# Patient Record
Sex: FEMALE | Race: WHITE | Employment: OTHER | URBAN - METROPOLITAN AREA
[De-identification: names, ages, dates, MRNs, and addresses within clinical notes are randomized per-mention and may not be internally consistent; named-entity substitution may affect disease eponyms.]

---

## 2023-12-04 DIAGNOSIS — L70.0 ACNE VULGARIS: Primary | ICD-10-CM

## 2023-12-05 RX ORDER — TAZAROTENE 1 MG/G
CREAM TOPICAL
Qty: 30 G | Refills: 0 | Status: SHIPPED | OUTPATIENT
Start: 2023-12-05

## 2024-01-09 ENCOUNTER — OFFICE VISIT (OUTPATIENT)
Dept: DERMATOLOGY | Facility: CLINIC | Age: 84
End: 2024-01-09
Payer: MEDICARE

## 2024-01-09 DIAGNOSIS — L82.0 INFLAMED SEBORRHEIC KERATOSIS: Primary | ICD-10-CM

## 2024-01-09 DIAGNOSIS — D48.5 NEOPLASM OF UNCERTAIN BEHAVIOR OF SKIN: ICD-10-CM

## 2024-01-09 DIAGNOSIS — K13.0 ANGULAR CHEILITIS: ICD-10-CM

## 2024-01-09 PROCEDURE — 88305 TISSUE EXAM BY PATHOLOGIST: CPT | Performed by: DERMATOLOGY

## 2024-01-09 PROCEDURE — 1160F RVW MEDS BY RX/DR IN RCRD: CPT | Performed by: DERMATOLOGY

## 2024-01-09 PROCEDURE — 1159F MED LIST DOCD IN RCRD: CPT | Performed by: DERMATOLOGY

## 2024-01-09 PROCEDURE — 99214 OFFICE O/P EST MOD 30 MIN: CPT | Performed by: DERMATOLOGY

## 2024-01-09 PROCEDURE — 17110 DESTRUCTION B9 LES UP TO 14: CPT | Performed by: DERMATOLOGY

## 2024-01-09 PROCEDURE — 88305 TISSUE EXAM BY PATHOLOGIST: CPT | Mod: TC,DER | Performed by: DERMATOLOGY

## 2024-01-09 PROCEDURE — 11306 SHAVE SKIN LESION 0.6-1.0 CM: CPT | Performed by: DERMATOLOGY

## 2024-01-09 RX ORDER — HYDROCORTISONE 25 MG/G
CREAM TOPICAL
Qty: 30 G | Refills: 0 | Status: SHIPPED | OUTPATIENT
Start: 2024-01-09

## 2024-01-09 RX ORDER — ESOMEPRAZOLE MAGNESIUM 40 MG/1
CAPSULE, DELAYED RELEASE ORAL
COMMUNITY
Start: 2023-10-28

## 2024-01-09 RX ORDER — DOXYCYCLINE 100 MG/1
CAPSULE ORAL
COMMUNITY
Start: 2023-05-22

## 2024-01-09 RX ORDER — INSULIN DETEMIR 100 [IU]/ML
INJECTION, SOLUTION SUBCUTANEOUS
COMMUNITY
Start: 2023-11-17

## 2024-01-09 RX ORDER — ATORVASTATIN CALCIUM 20 MG/1
TABLET, FILM COATED ORAL
COMMUNITY
Start: 2023-12-22

## 2024-01-09 RX ORDER — FLUCONAZOLE 150 MG/1
TABLET ORAL
COMMUNITY
Start: 2023-05-22

## 2024-01-09 RX ORDER — MONTELUKAST SODIUM 10 MG/1
TABLET ORAL
COMMUNITY
Start: 2024-01-08

## 2024-01-09 RX ORDER — MINOCYCLINE HYDROCHLORIDE 50 MG/1
CAPSULE ORAL
COMMUNITY
Start: 2022-12-05

## 2024-01-09 RX ORDER — TRETINOIN 0.5 MG/G
CREAM TOPICAL
COMMUNITY
Start: 2016-09-17

## 2024-01-09 RX ORDER — LIRAGLUTIDE 6 MG/ML
INJECTION SUBCUTANEOUS
COMMUNITY
Start: 2023-10-10

## 2024-01-09 RX ORDER — METFORMIN HYDROCHLORIDE 1000 MG/1
TABLET ORAL
COMMUNITY
Start: 2023-12-12

## 2024-01-09 RX ORDER — VENLAFAXINE HYDROCHLORIDE 75 MG/1
CAPSULE, EXTENDED RELEASE ORAL
COMMUNITY
Start: 2023-11-17

## 2024-01-09 RX ORDER — BUSPIRONE HYDROCHLORIDE 7.5 MG/1
TABLET ORAL
COMMUNITY
Start: 2023-11-27

## 2024-01-09 RX ORDER — EMPAGLIFLOZIN 25 MG/1
TABLET, FILM COATED ORAL
COMMUNITY
Start: 2023-12-29

## 2024-01-09 RX ORDER — KETOCONAZOLE 20 MG/G
CREAM TOPICAL
Qty: 30 G | Refills: 2 | Status: SHIPPED | OUTPATIENT
Start: 2024-01-09

## 2024-01-09 RX ORDER — LEVOCETIRIZINE DIHYDROCHLORIDE 5 MG/1
TABLET, FILM COATED ORAL
COMMUNITY
Start: 2024-01-04

## 2024-01-09 RX ORDER — IRBESARTAN 300 MG/1
TABLET ORAL
COMMUNITY
Start: 2024-01-02

## 2024-01-09 RX ORDER — TRAZODONE HYDROCHLORIDE 100 MG/1
TABLET ORAL
COMMUNITY
Start: 2023-11-03

## 2024-01-09 RX ORDER — HYDROXYZINE PAMOATE 25 MG/1
CAPSULE ORAL
COMMUNITY
Start: 2023-12-04

## 2024-01-09 ASSESSMENT — ITCH NUMERIC RATING SCALE: HOW SEVERE IS YOUR ITCHING?: 0

## 2024-01-09 NOTE — PROGRESS NOTES
Subjective     Nanette Da Silva is a 83 y.o. female who presents for the following: Suspicious Skin Lesion (Left hand. Patient states onset was about a month ago. Denies pain or itching. ).     Review of Systems:  No other skin or systemic complaints other than what is documented elsewhere in the note.    The following portions of the chart were reviewed this encounter and updated as appropriate:   Allergies  Meds  Problems  Med Hx  Surg Hx  Fam Hx         Skin Cancer History  No skin cancer on file.      Specialty Problems    None       Objective   Well appearing patient in no apparent distress; mood and affect are within normal limits.    A focused skin examination was performed. All findings within normal limits unless otherwise noted below.    Assessment/Plan   1. Inflamed seborrheic keratosis (5)  Chest - Medial (Center), Left Breast, Neck - Anterior (2), Right Breast  Stuck-on, waxy macule(s)/papule(s)/plaque(s) with comedo-like openings and milia-like cysts with surrounding erythema and crusting    -Patient requests cryotherapy today for these clinically inflamed lesions  -Possible side effects of liquid nitrogen treatment reviewed including formation of blisters, crusting, tenderness, scar, and discoloration which may be permanent.    Destr of lesion - Chest - Medial (Center), Left Breast, Neck - Anterior (2), Right Breast  Complexity: simple    Destruction method: cryotherapy    Informed consent: discussed and consent obtained    Lesion destroyed using liquid nitrogen: Yes    Outcome: patient tolerated procedure well with no complications      2. Angular cheilitis  Left Oral Commissure, Right Oral Commissure  Erythematous patches with fissuring of the bilateral oral commissures    -Discussed the nature of condition  -Recommend: rx Hydrocortisone 2.5% cream and rx Ketoconazole 2% cream; mix together equal parts and apply a thin layer to affected areas on the face twice a day for 1-2 weeks, then  discontinue  -After condition has improved, then use Ketoconazole 2% cream ONLY to affected areas nightly to prevent recurrence. Do not use rx Hydrocortisone cream long term.  -Discussed with/information given to the patient on the risks, benefits and alternatives of the usage of topical corticosteroids, including but not limited to: atrophy (thinning of the skin), striae (stretch marks), telangiectasia (blood vessel growth), and dyspigmentation (discoloration of the skin).  -Recommend to limit long-term use of topical corticosteroids to less than 14 days per month to reduce risk of side effects.      hydrocortisone 2.5 % cream - Left Oral Commissure, Right Oral Commissure  Apply to affected areas twice daily when active. Use less than 14 days per month.    ketoconazole (NIZOral) 2 % cream - Left Oral Commissure, Right Oral Commissure  Apply to affected areas twice daily when active. Once controlled, apply once daily for maintenance.    3. Neoplasm of uncertain behavior of skin  Left Dorsal Hand  9mm erythematous structureless crusted papule              Shave removal    Lesion diameter (cm):  0.9  Informed consent: discussed and consent obtained    Timeout: patient name, date of birth, surgical site, and procedure verified    Procedure prep:  Patient was prepped and draped  Anesthesia: the lesion was anesthetized in a standard fashion    Anesthetic:  1% lidocaine w/ epinephrine 1-100,000 local infiltration  Instrument used: DermaBlade    Hemostasis achieved with: aluminum chloride    Outcome: patient tolerated procedure well    Post-procedure details: sterile dressing applied and wound care instructions given    Dressing type: bandage and petrolatum      Staff Communication: Dermatology Local Anesthesia: Site Location: L dorsal hand 1 % Lidocaine / Epinephrine - Amount: 0.5cc    Specimen 1 - Dermatopathology- DERM LAB  Differential Diagnosis: r/o LPLK v NMSC  Check Margins Yes/No?:    Comments:    Dermpath Lab:  Routine Histopathology (formalin-fixed tissue)        Follow up as needed  Discussed if there are any changes or development of concerning symptoms (lesion/skin condition is changing, bleeding, enlarging, or worsening) the patient is to contact my office. The patient verbalizes understanding.    Karol Garrett MD  1/9/2024

## 2024-01-11 LAB
LABORATORY COMMENT REPORT: NORMAL
PATH REPORT.FINAL DX SPEC: NORMAL
PATH REPORT.GROSS SPEC: NORMAL
PATH REPORT.MICROSCOPIC SPEC OTHER STN: NORMAL
PATH REPORT.RELEVANT HX SPEC: NORMAL
PATH REPORT.TOTAL CANCER: NORMAL

## 2024-01-12 DIAGNOSIS — C44.92 SCC (SQUAMOUS CELL CARCINOMA): Primary | ICD-10-CM

## 2024-01-15 NOTE — RESULT ENCOUNTER NOTE
Pt was contacted and message unable to be left regarding biopsy results. Call back number left.     Dorian Levy LPN

## 2024-01-15 NOTE — RESULT ENCOUNTER NOTE
Pt contacted at this time and notified of positive biopsy at this time.  Pt is agreeable to treatment.    Dorian Levy LPN

## 2024-04-26 ENCOUNTER — PROCEDURE VISIT (OUTPATIENT)
Dept: DERMATOLOGY | Facility: CLINIC | Age: 84
End: 2024-04-26
Payer: MEDICARE

## 2024-04-26 VITALS — SYSTOLIC BLOOD PRESSURE: 180 MMHG | HEART RATE: 97 BPM | DIASTOLIC BLOOD PRESSURE: 82 MMHG

## 2024-04-26 DIAGNOSIS — D04.62 SQUAMOUS CELL CARCINOMA IN SITU (SCCIS) OF SKIN OF LEFT HAND: ICD-10-CM

## 2024-04-26 PROCEDURE — 99214 OFFICE O/P EST MOD 30 MIN: CPT | Performed by: STUDENT IN AN ORGANIZED HEALTH CARE EDUCATION/TRAINING PROGRAM

## 2024-04-26 PROCEDURE — 17311 MOHS 1 STAGE H/N/HF/G: CPT | Performed by: STUDENT IN AN ORGANIZED HEALTH CARE EDUCATION/TRAINING PROGRAM

## 2024-04-26 NOTE — PROGRESS NOTES
Office Visit Note  Date: 4/26/2024  Surgeon:  Martin Arce MD  Office Location: [facility] [address] [address]  Dept: [phone]  Dept Fax: [phone]  Referring Provider: Karol Garrett MD  [address] [place] [address]    Subjective   Nanette Da Silva is a 83 y.o. female who presents for the following: MOHS Surgery    According to the patient, the lesion has been present for approximately greater than 1 year at the time of diagnosis.  The lesion is not causing symptoms.  The lesion has not been treated previously.    The patient does not have a pacemaker / defibrillator.  The patient does not have a heart valve / joint replacement.    The patient is on blood thinners.  The patient does not have a history of hepatitis B or C.  The patient does not have a history of HIV.  The patient does not have a history of immunosuppression (e.g. organ transplantation, malignancy, medications)    Review of Systems:  No other skin or systemic complaints other than what is documented elsewhere in the note.    MEDICAL HISTORY: clinically relevant history including significant past medical history, medications and allergies was reviewed and documented in Epic.    Objective   Well appearing patient in no apparent distress; mood and affect are within normal limits.  Vital signs: See record.  Noted on the Left Dorsal Hand  Is a 0.9 x 0.7 cm scar        The patient confirmed the identified site.    Discussion:  The nature of the diagnosis was explained. The lesion is a skin cancer.  It has a risk of local growth and distant spread. The condition is associated with sun exposure.  Warning signs of non-melanoma skin cancer discussed. Patient was instructed to perform monthly self skin examination.  We recommended that the patient have regular full skin exams given an increased risk of subsequent skin cancers. The patient was instructed to use sun  protective behaviors including use of broad spectrum sunscreens and sun protective clothing to reduce risk of skin cancers.      Risks, benefits, side effects of Mohs surgery were discussed with patient and the patient voiced understanding.  It was explained that even though the cure rate of Mohs is very high it is not 100%. Risks of surgery including but not limited to bleeding, infection, numbness, nerve damage, and scar were reviewed.  Discussion included wound care requirements, activity restrictions, likely scar outcome and time to heal.     After Mohs surgery, the defect may need to be repaired surgically and the scar may be longer than the original lesion.  Reconstruction options, risks, and benefits were reviewed including second intention healing, linear repair (4-1 ratio was explained), local flaps, skin grafts, cartilage grafts and interpolation flaps (the need for multiple surgeries was explained). Possible outcomes were reviewed including likely scar appearance, failure of flap survival, infection, bleeding and the need for revision surgery.     The patient has a squamous cell carcinoma. It was reviewed with the patient that the skin cancer  will continue to progress without treatment. The pathology was reviewed, the photograph was reviewed, and the referring physician's note was reviewed.    Medical Decision Making - moderate  Column 1 - chronic illness with progression  Column 3 - decision regarding minor surgery with identified risk factors (bleeding, infection, scarring). Moderate risk of morbidity from additional treatment - mohs surgery      Patient elected for Mohs surgery.

## 2024-04-26 NOTE — PROGRESS NOTES
Mohs Surgery Operative Note    Date of Surgery:  4/26/2024  Surgeon:  Martin Arce MD  Office Location: [facility] [address] [address]  Dept: [phone]  Dept Fax: [phone]  Referring Provider: Karol Garrett MD  [address] [place] [id number]      Assessment/Plan   Pre-procedure:   Obtained informed consent: written from patient  The surgical site was identified and confirmed with the patient.     Intra-operative:   Audible time out called at : 8:31 AM 04/26/24  by: Johana Gustafson RN   Verified patient name, birthdate, site, specimen bottle label & requisition.    The planned procedure(s) was again reviewed with the patient. The risks of bleeding, infection, nerve damage and scarring were reviewed. Written authorization was obtained. The patient identity, surgical site, and planned procedure(s) were verified. The provider acted as both surgeon and pathologist.     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  Left Dorsal Hand    Mohs surgery    Consent obtained: written    Universal Protocol:  Procedure explained and questions answered to patient or proxy's satisfaction: Yes    Test results available and properly labeled: Yes    Pathology report reviewed: Yes    External notes reviewed: Yes    Photo or diagram used for site identification: Yes    Site/side marked: Yes    Slide independently reviewed by Mohs surgeon: Yes    Immediately prior to procedure a time out was called: Yes    Patient identity confirmed: verbally with patient  Preparation: Patient was prepped and draped in usual sterile fashion      Anticoagulation:  Is the patient taking prescription anticoagulant and/or aspirin prescribed/recommended by a physician? Yes    Was the anticoagulation regimen changed prior to Mohs? No      Anesthesia:  Anesthesia method: local infiltration  Local anesthetic: lidocaine 1% WITH epi    Procedure  Details:  Biopsy accession number: [id number]  Date of biopsy: 1/9/2024  Pre-Op diagnosis: squamous cell carcinoma  SCC subtype: in situ  Surgery side: left  Surgical site (from skin exam): Left Dorsal Hand  Pre-operative length (cm): 0.9  Pre-operative width (cm): 0.7  Indications for Mohs surgery: anatomic location where tissue conservation is critical    Micrographic Surgery Details:  Post-operative length (cm): 0.9  Post-operative width (cm): 1  Number of Mohs stages: 1    Stage 1     Comments: The patient was brought into the operating room and placed in the procedure chair in the appropriate position.  The area positive by previous biopsy was identified and confirmed with the patient. The area of clinically obvious tumor was debulked using a curette and/or scalpel as needed. An incision was made following the Mohs approach through the skin. The specimen was taken to the lab, divided into 2 piece(s) and appropriately chromacoded and processed.       Tumor features identified on Mohs section: no tumor identified    Depth of defect: subcutaneous fat    Patient tolerance of procedure: tolerated well, no immediate complications    Reconstruction:  Was the defect reconstructed?: No    Fine/surface layer approximation (top stitches)   Hemostasis achieved with: electrodesiccation  Outcome: patient tolerated procedure well with no complications    Post-procedure details: sterile dressing applied and wound care instructions given    Dressing type: pressure dressing and Gelfoam      Staff Communication: Dermatology Local Anesthesia: 1 % Lidocaine / Epinephrine - Amount: 1.5cc        Repair: After a discussion with the patient regarding the options for wound closure, a decision was made to proceed with second intention healing.  Dressing F/U: Surgifoam was placed in the wound. A pressure dressing was placed to help stabilize the wound and to minimize the risk of postoperative bleeding. Wound care was discussed, and the  patient was given written post-operative wound care instructions.              Wound care was discussed, and the patient was given written post-operative wound care instructions.      The patient will follow up with Martin Arce MD as needed for any post operative problems or concerns, and will follow up with their primary dermatologist as scheduled.

## 2024-09-05 DIAGNOSIS — L98.8 FACIAL RHYTIDS: Primary | ICD-10-CM

## 2024-09-06 RX ORDER — TRETINOIN 1 MG/G
CREAM TOPICAL
Qty: 45 G | Refills: 0 | Status: SHIPPED | OUTPATIENT
Start: 2024-09-06

## 2024-10-08 ENCOUNTER — APPOINTMENT (OUTPATIENT)
Dept: DERMATOLOGY | Facility: CLINIC | Age: 84
End: 2024-10-08
Payer: MEDICARE

## 2024-10-08 DIAGNOSIS — L82.0 INFLAMED SEBORRHEIC KERATOSIS: ICD-10-CM

## 2024-10-08 DIAGNOSIS — L72.0 MILIA: ICD-10-CM

## 2024-10-08 DIAGNOSIS — Z85.828 PERSONAL HISTORY OF SKIN CANCER: ICD-10-CM

## 2024-10-08 DIAGNOSIS — L73.8 SEBACEOUS HYPERPLASIA OF FACE: Primary | ICD-10-CM

## 2024-10-08 DIAGNOSIS — D17.24 LIPOMA OF LEFT LOWER EXTREMITY: ICD-10-CM

## 2024-10-08 PROCEDURE — 99213 OFFICE O/P EST LOW 20 MIN: CPT | Performed by: DERMATOLOGY

## 2024-10-08 PROCEDURE — 17110 DESTRUCTION B9 LES UP TO 14: CPT | Performed by: DERMATOLOGY

## 2024-10-08 PROCEDURE — 1160F RVW MEDS BY RX/DR IN RCRD: CPT | Performed by: DERMATOLOGY

## 2024-10-08 PROCEDURE — 1159F MED LIST DOCD IN RCRD: CPT | Performed by: DERMATOLOGY

## 2024-10-08 RX ORDER — BISACODYL 5 MG/1
1 TABLET, COATED ORAL
COMMUNITY

## 2024-10-08 RX ORDER — ZOLEDRONIC ACID 5 MG/100ML
INJECTION, SOLUTION INTRAVENOUS
COMMUNITY

## 2024-10-08 RX ORDER — CLOBETASOL PROPIONATE 0.5 MG/G
1 OINTMENT TOPICAL 2 TIMES WEEKLY
COMMUNITY
Start: 2024-07-02

## 2024-10-08 RX ORDER — ASPIRIN 81 MG/1
1 TABLET ORAL DAILY
COMMUNITY

## 2024-10-08 RX ORDER — FERROUS SULFATE 325(65) MG
1 TABLET ORAL
COMMUNITY
Start: 2024-01-15

## 2024-10-08 RX ORDER — VALSARTAN 160 MG/1
1 TABLET ORAL DAILY
COMMUNITY

## 2024-10-08 RX ORDER — AMLODIPINE BESYLATE 5 MG/1
5 TABLET ORAL
COMMUNITY
Start: 2024-10-03 | End: 2025-01-01

## 2024-10-08 RX ORDER — FLUTICASONE PROPIONATE 50 MCG
SPRAY, SUSPENSION (ML) NASAL EVERY 24 HOURS
COMMUNITY

## 2024-10-08 RX ORDER — CYANOCOBALAMIN (VITAMIN B-12) 500 MCG
1 TABLET ORAL DAILY
COMMUNITY

## 2024-10-08 RX ORDER — GLIMEPIRIDE 4 MG/1
1 TABLET ORAL
COMMUNITY

## 2024-10-08 RX ORDER — AZELASTINE HYDROCHLORIDE 0.5 MG/ML
SOLUTION/ DROPS OPHTHALMIC
COMMUNITY
Start: 2023-06-07

## 2024-10-08 NOTE — PROGRESS NOTES
Subjective     Nanette Da Silva is a 84 y.o. female who presents for the following: folliculitis  (Frontal scalp- pt states tried clindamycin solution- no response) and Suspicious Skin Lesion (Face/chest lesions- Personal history of squamous cell carcinoma.  //Left thigh lump per pt. ).     Review of Systems:  No other skin or systemic complaints other than what is documented elsewhere in the note.    The following portions of the chart were reviewed this encounter and updated as appropriate:   Allergies  Meds  Problems  Med Hx  Surg Hx  Fam Hx         Skin Cancer History  Biopsy Date Type Location Status   1/9/24 SCC in Situ Left Dorsal Hand Treatment Complete  4/30/24       Specialty Problems    None       Objective   Well appearing patient in no apparent distress; mood and affect are within normal limits.    A focused skin examination was performed. All findings within normal limits unless otherwise noted below.    Assessment/Plan   1. Sebaceous hyperplasia of face  Head - Anterior (Face)  Yellow papule(s) with central dell and crown of vessels    (lesions of patient's concern)  -Discussed nature of condition  -Reassurance, recommend continued observation    2. Inflamed seborrheic keratosis (13)  Left Lower Back (2), Neck - Anterior (5), Right Breast (2), Right Lower Back (3), Right Popliteal Fossa  Stuck-on, waxy macule(s)/papule(s)/plaque(s) with comedo-like openings and milia-like cysts with surrounding erythema and crusting    -Patient requests cryotherapy today for these clinically inflamed lesions  -Possible side effects of liquid nitrogen treatment reviewed including formation of blisters, crusting, tenderness, scar, and discoloration which may be permanent.    Destr of lesion - Left Lower Back (2), Neck - Anterior (5), Right Breast (2), Right Lower Back (3), Right Popliteal Fossa  Complexity: simple    Destruction method: cryotherapy    Informed consent: discussed and consent obtained    Lesion  destroyed using liquid nitrogen: Yes    Outcome: patient tolerated procedure well with no complications      3. Lipoma of left lower extremity  Left Thigh - Anterior  Subcutaneous soft mobile plaque    -Discussed nature of diagnosis  -Reassurance, recommend observation    4. Milia (4)  Left Buccal Cheek (2), Mid Forehead, Mid Lower Cutaneous Lip  Minute yellow-white papule(s)     -Reviewed benign nature of condition  -Patient requests removal of the lesion(s). After verbal consent obtained, the area was prepped with alcohol, nicked with a No. 11 blade, and contents were expressed with comedone extractor. Hemostasis achieved by pressure. The patient tolerated the procedure well without any issues.    5. Personal history of skin cancer    Personal History of Non-Melanoma Skin Cancer, SCC L dorsal hand, January 2024  -Well healed scar(s) with no evidence of recurrence  -Discussed the need for annual or semi-annual skin examinations and to return sooner if any new or changing lesions are noticed. Patient verbalizes understanding        Follow up as needed. Patient declines full skin examinations.  Discussed if there are any changes or development of concerning symptoms (lesion/skin condition is changing, bleeding, enlarging, or worsening) the patient is to contact my office. The patient verbalizes understanding.    Karol Garrett MD  10/8/2024

## 2024-10-13 DIAGNOSIS — L98.8 FACIAL RHYTIDS: ICD-10-CM

## 2024-10-14 RX ORDER — TRETINOIN 1 MG/G
CREAM TOPICAL
Qty: 45 G | Refills: 0 | Status: SHIPPED | OUTPATIENT
Start: 2024-10-14

## 2024-11-26 DIAGNOSIS — L98.8 FACIAL RHYTIDS: ICD-10-CM

## 2024-11-26 RX ORDER — TRETINOIN 1 MG/G
CREAM TOPICAL
Qty: 45 G | Refills: 0 | Status: SHIPPED | OUTPATIENT
Start: 2024-11-26

## 2025-04-29 ENCOUNTER — APPOINTMENT (OUTPATIENT)
Dept: DERMATOLOGY | Facility: CLINIC | Age: 85
End: 2025-04-29
Payer: MEDICARE

## 2025-09-30 ENCOUNTER — APPOINTMENT (OUTPATIENT)
Dept: DERMATOLOGY | Facility: CLINIC | Age: 85
End: 2025-09-30
Payer: MEDICARE